# Patient Record
Sex: FEMALE | URBAN - METROPOLITAN AREA
[De-identification: names, ages, dates, MRNs, and addresses within clinical notes are randomized per-mention and may not be internally consistent; named-entity substitution may affect disease eponyms.]

---

## 2019-11-30 ENCOUNTER — EMERGENCY (EMERGENCY)
Facility: HOSPITAL | Age: 49
LOS: 1 days | End: 2019-11-30
Payer: SELF-PAY

## 2019-11-30 VITALS
WEIGHT: 104.94 LBS | TEMPERATURE: 98 F | OXYGEN SATURATION: 100 % | DIASTOLIC BLOOD PRESSURE: 75 MMHG | SYSTOLIC BLOOD PRESSURE: 130 MMHG | RESPIRATION RATE: 16 BRPM | HEART RATE: 77 BPM

## 2019-11-30 PROCEDURE — 82962 GLUCOSE BLOOD TEST: CPT

## 2022-02-28 NOTE — ED ADULT NURSE NOTE - NS ED NURSE DISCH DISPOSITION
Patient was kept on OBS for palpitations under the care of hospital medicine. Patient presently at [facility]. Echo stable. Cardiology was consulted and echo is stable. Discussed with cardiology - labs and VS stable. Patient will dc home and f/u OP with her cardiologist - Dr. Hancock. Patient was seen and examined today and deemed stable for discharge home.   Left without being seen (saw a nurse but never saw a physician or midlevel provider)